# Patient Record
Sex: MALE | Race: WHITE | NOT HISPANIC OR LATINO | Employment: OTHER | URBAN - NONMETROPOLITAN AREA
[De-identification: names, ages, dates, MRNs, and addresses within clinical notes are randomized per-mention and may not be internally consistent; named-entity substitution may affect disease eponyms.]

---

## 2017-07-13 ENCOUNTER — TRANSCRIBE ORDERS (OUTPATIENT)
Dept: PHYSICAL THERAPY | Facility: CLINIC | Age: 67
End: 2017-07-13

## 2017-07-13 DIAGNOSIS — Z98.890 S/P ROTATOR CUFF REPAIR: Primary | ICD-10-CM

## 2017-07-14 ENCOUNTER — CONSULT (OUTPATIENT)
Dept: PHYSICAL THERAPY | Facility: CLINIC | Age: 67
End: 2017-07-14

## 2017-07-14 DIAGNOSIS — Z98.890 S/P ROTATOR CUFF REPAIR: Primary | ICD-10-CM

## 2017-07-14 PROCEDURE — 97110 THERAPEUTIC EXERCISES: CPT | Performed by: PHYSICAL THERAPIST

## 2017-07-14 PROCEDURE — G0283 ELEC STIM OTHER THAN WOUND: HCPCS | Performed by: PHYSICAL THERAPIST

## 2017-07-14 PROCEDURE — G8985 CARRY GOAL STATUS: HCPCS | Performed by: PHYSICAL THERAPIST

## 2017-07-14 PROCEDURE — G8984 CARRY CURRENT STATUS: HCPCS | Performed by: PHYSICAL THERAPIST

## 2017-07-14 PROCEDURE — 97161 PT EVAL LOW COMPLEX 20 MIN: CPT | Performed by: PHYSICAL THERAPIST

## 2017-07-14 NOTE — PROGRESS NOTES
Outpatient Physical Therapy Ortho Initial Evaluation       Patient Name: Earl Romano  : 1950  MRN: [id number]  Today's Date: 2017      Visit Date: 2017    TIME IN 1145    TIME OUT 1233    There is no problem list on file for this patient.       History reviewed. No pertinent past medical history.     No past surgical history on file.    Visit Dx:     ICD-10-CM ICD-9-CM   1. S/P rotator cuff repair Z98.890 V45.89     Outpatient Medications     None      Allergies: NKA    Subjective Evaluation    History of Present Illness  Mechanism of injury: 68 yo male with a traumatic fall while fishing and had a left rotator cuff tear, s/p left rotator cuff repair on 17. Now 2 1/2 weeks post op and in a sling. Pt c/o severe left shoulder pain despite using pain meds and having trouble sleeping at night, having to sleep in a chair.     Quality of life: good    Pain  Current pain ratin  At best pain ratin  At worst pain rating: 10  Location: left shoulder  Quality: burning  Relieving factors: ice and support  Aggravating factors: sleeping and movement  Progression: improved    Social Support  Lives with: spouse    Hand dominance: right    Treatments  Current treatment: medication and physical therapy  Patient Goals  Patient goals for therapy: decreased pain, increased motion, increased strength and return to sport/leisure activities  Patient goal: STG's (in 2 weeks)  1. Pt I with HEP  2. Improve left shoulder PROM flex/abd to 100 deg and 90 deg  3. Improve left elbow extension AROM to 0 deg  4. Reduce left shoulder pain to 4/10 performing ADL's.    LTG's (in 4 weeks)  1. Improve left shoulder PROM flex/abd to 130 deg and 110 degrees.  2. Improve left shoulder MMT to 3/5 (all planes) and 4/5 left elbow flex/ext.  3. Reduce left shoulder pain to 2/10 performing ADL's.  4. Reduce Quick Dash score to 40 or less.       RUE PROM (degrees)  R Shoulder Flexion  0-170: 125 Degrees (AROM)  R Shoulder  ABduction 0-140: 165 Degrees (AROM)  RUE Strength  RUE Overall Strength: Within Functional Limits - strength 5/5           LUE PROM (degrees)  L Shoulder Flexion  0-170: 64 Degrees  L Shoulder ABduction 0-40: 53 Degrees  L Shoulder Internal Rotation  0-70: 50 Degrees  L Shoulder External Rotation  0-90: 30 Degrees  L Elbow Flexion/Extension 0-135-150:  (-10 deg ext)  LUE Strength  LUE Overall Strength:  (left shoulder NT due to recent RTC repair)  L Elbow Flexion: 3/5  L Elbow Extension: 3/5            Body Part  Body Part: Shoulder                   Ice  Ice Applied: Yes  Location: L shoulder  Rx Minutes: 15 mins  Ice S/P Rx: Yes    Electrical Stimulation  Stimulation Type: IFC  Location/Electrode Placement/Other: L shoulder  Rx Minutes: 15 mins              Exercises       07/14/17 1230          Exercise 1    Exercise Name 1 pendulums: side to side/cw-ccw/f-b  -BS      Exercise 2    Exercise Name 2 scapular retractions  -BS      Exercise 3    Exercise Name 3 shoulder shrugs  -BS        User Key  (r) = Recorded By, (t) = Taken By, (c) = Cosigned By    Initials Name Provider Type    JOSÉ MIGUEL Cardona, PT Physical Therapist           Therapeutic exercise (total time): 10 minutes          Assessment & Plan     Assessment  Impairments: abnormal or restricted ROM, activity intolerance, impaired physical strength, lacks appropriate home exercise program and pain with function  Assessment details: 68 yo male with acute left shoulder pain s/p left rotator cuff repair. Presents with limited left shoulder PROM, impaired left shoulder weakness, left shoulder pain and loss in function.  Prognosis: good    Goals  STG's (in 2 weeks)  1. Pt I with HEP  2. Improve left shoulder PROM flex/abd to 100 deg and 90 deg  3. Improve left elbow extension AROM to 0 deg  4. Reduce left shoulder pain to 4/10 performing ADL's.    LTG's (in 4 weeks)  1. Improve left shoulder PROM flex/abd to 130 deg and 110 degrees.  2. Improve left shoulder MMT  to 3/5 (all planes) and 4/5 left elbow flex/ext.  3. Reduce left shoulder pain to 2/10 performing ADL's.  4. Reduce Quick Dash score to 40 or less.    Plan  Therapy options: will be seen for skilled physical therapy services  Planned modality interventions: cryotherapy, electrical stimulation/Russian stimulation, thermotherapy (hydrocollator packs) and ultrasound  Planned therapy interventions: flexibility, functional ROM exercises, home exercise program, joint mobilization, manual therapy, postural training, soft tissue mobilization, spinal/joint mobilization, strengthening and stretching  Frequency: 3x week  Duration in weeks: 12  Treatment plan discussed with: patient  Plan details: F/u with rotator cuff repair protocol.  Functional Limitations: carrying objects, lifting, pushing, uncomfortable because of pain, moving in bed, reaching behind back, reaching overhead and unable to perform repetitive tasks      Time Calculation: 41            PT G-Codes  Outcome Measure Options: Quick DASH  Score: 84.09  Functional Limitation: Carrying, moving and handling objects  Carrying, Moving and Handling Objects Current Status (): At least 80 percent but less than 100 percent impaired, limited or restricted  Carrying, Moving and Handling Objects Goal Status (): At least 1 percent but less than 20 percent impaired, limited or restricted         Etienne Cardona, PT  7/14/2017        Earl Romano    1950

## 2017-07-18 ENCOUNTER — TREATMENT (OUTPATIENT)
Dept: PHYSICAL THERAPY | Facility: CLINIC | Age: 67
End: 2017-07-18

## 2017-07-18 DIAGNOSIS — Z98.890 S/P ROTATOR CUFF REPAIR: Primary | ICD-10-CM

## 2017-07-18 PROCEDURE — 97110 THERAPEUTIC EXERCISES: CPT | Performed by: PHYSICAL THERAPIST

## 2017-07-18 PROCEDURE — G0283 ELEC STIM OTHER THAN WOUND: HCPCS | Performed by: PHYSICAL THERAPIST

## 2017-07-18 NOTE — PROGRESS NOTES
Daily Treatment Note    Time In: 10:10     Time Out: 11:02    ICD-10-CM ICD-9-CM   1. S/P rotator cuff repair Z98.890 V45.89       Subjective   S/P 3 weeks 1 day. Pt reports not having a good day.   Patient reports to therapy 8/10 pain, and % improvement.  Attendance  2/2 visits. Recert 8-4-17      Objective     V cues necessary for correct TE performance. Mild guarding intermittent with PROM flex/abd. PROM flex and abd about 100°.       PROCEDURES AND MODALITIES:     Ice  Ice Applied: Yes  Location: L shoulder  Rx Minutes: 15 mins  Ice S/P Rx: Yes    Electrical Stimulation  Stimulation Type: IFC  Location/Electrode Placement/Other: L shoulder  Rx Minutes: 15 mins       EXERCISE  Exercise 1  Exercise Name 1: Post shld rolls  Sets/Reps 1: 20x  Exercise 2  Exercise Name 2: Scap retraction  Sets/Reps 2: 20x Exercise 3  Exercise Name 3: Shoulder shrugs  Sets/Reps 3: 20x Exercise 4  Exercise Name 4: PROM (PROM for 6 weeks post op)  Time 4: 18'                                                   Therapy Exercise [id number] 37 minutes and Other Procedure CPT 15 minutes Electrical Stimulation    Total Treatment Time: 52 Minutes    Assessment/Plan   ROM improved. Good tolerance of today's treatment.   Progress per Plan of Care             Ramiro Cheng, PTA  Physical Therapist

## 2017-07-19 ENCOUNTER — TREATMENT (OUTPATIENT)
Dept: PHYSICAL THERAPY | Facility: CLINIC | Age: 67
End: 2017-07-19

## 2017-07-19 DIAGNOSIS — Z98.890 S/P ROTATOR CUFF REPAIR: Primary | ICD-10-CM

## 2017-07-19 PROCEDURE — G0283 ELEC STIM OTHER THAN WOUND: HCPCS | Performed by: PHYSICAL THERAPIST

## 2017-07-19 PROCEDURE — 97110 THERAPEUTIC EXERCISES: CPT | Performed by: PHYSICAL THERAPIST

## 2017-07-19 NOTE — PROGRESS NOTES
Daily Treatment Note    Time In: 1445      Time Out: 1540    ICD-10-CM ICD-9-CM   1. S/P rotator cuff repair Z98.890 V45.89       Subjective   Pt reports doing HEP 3x/day  Patient reports to therapy 6/10 pain in the left shoulder.       Visit: 3/3    Return to MD: 3- 4 weeks    Re-certification date: 8/4/17    % improvement: 5%        Objective        PROM: left shoulder abduction ~ 80 degrees            PROCEDURES AND MODALITIES:            Ice  Ice Applied: Yes  Location: L shoulder  Rx Minutes: 15 mins  Ice S/P Rx: Yes    Electrical Stimulation  Stimulation Type: IFC  Location/Electrode Placement/Other: L shoulder  Rx Minutes: 15 mins                   EXERCISE  Exercise 1  Exercise Name 1: pendulums: side to side/cw-ccw/f-b  Sets/Reps 1: 20x  Exercise 2  Exercise Name 2: Scap retraction  Sets/Reps 2: 20x Exercise 3  Exercise Name 3: Shoulder shrugs  Sets/Reps 3: 20x Exercise 4  Exercise Name 4: PROM Exercise 5  Exercise Name 5: seated pulleys  Equipment/Resistance 5: flex/scap  Time 5: 2'/2' Exercise 6  Exercise Name 6: supine AAROM L shoulder flex  Sets/Reps 6: 1/20   Exercise 7  Exercise Name 7: supine L shoulder AAROM ER/IR w/ dowel  Sets/Reps 7: 1/20                                           Therapy Exercise [id number] 40 minutes and Other Procedure CPT 15 minutes unattended electrical stimulation    Total Treatment Time: 55 Minutes    Assessment/Plan   Patient with good activity tolerance, reduced left shoulder pain to 5/10 level after session, pt feels the left shoulder felt looser after the session  Recheck tolerance to use of seated pulleys         Etienne Cardona, PT  Physical Therapist

## 2017-07-21 ENCOUNTER — TREATMENT (OUTPATIENT)
Dept: PHYSICAL THERAPY | Facility: CLINIC | Age: 67
End: 2017-07-21

## 2017-07-21 DIAGNOSIS — Z98.890 S/P ROTATOR CUFF REPAIR: Primary | ICD-10-CM

## 2017-07-21 PROCEDURE — 97140 MANUAL THERAPY 1/> REGIONS: CPT | Performed by: PHYSICAL THERAPIST

## 2017-07-21 PROCEDURE — G0283 ELEC STIM OTHER THAN WOUND: HCPCS | Performed by: PHYSICAL THERAPIST

## 2017-07-21 PROCEDURE — 97110 THERAPEUTIC EXERCISES: CPT | Performed by: PHYSICAL THERAPIST

## 2017-07-21 NOTE — PROGRESS NOTES
Daily Treatment Note    Time In: 0815      Time Out: 0905    ICD-10-CM ICD-9-CM   1. S/P rotator cuff repair Z98.890 V45.89       Subjective   Pt reports did HEP 3 times yesterday, feels a bit more sore than usual this morning, difficulty sleeping at night, sleeps in a chair still, feels like toothache type pain in the deltoid region, iced his shoulder twice a day yesterday.  Patient reports to therapy 5-6/10 pain, and  5% improvement.  Attendance  4/4 visits.       Objective        PROM: left shoulder abduction ~100 deg, left shoulder flex ~110 deg            PROCEDURES AND MODALITIES:            Ice  Ice Applied: Yes  Location: L shoulder  Rx Minutes: 15 mins  Ice S/P Rx: Yes    Electrical Stimulation  Stimulation Type: IFC  Location/Electrode Placement/Other: L shoulder  Rx Minutes: 15 mins            EXERCISE  Exercise 1  Exercise Name 1: pendulums: side to side/cw-ccw/f-b  Exercise 2  Exercise Name 2: Scap retraction Exercise 3  Exercise Name 3: Shoulder shrugs Exercise 4  Exercise Name 4: PROM  Time 4: 6' Exercise 5  Exercise Name 5: seated pulleys  Time 5: 2'/2' Exercise 6  Exercise Name 6: supine AAROM L shoulder flex  Sets/Reps 6: 1/15   Exercise 7  Exercise Name 7: supine L shoulder AAROM ER/IR w/ dowel Exercise 8  Exercise Name 8: left elbow flex/ext AROM  Sets/Reps 8: 1/20                                     Therapeutic exercise (total time): 25 minutes    MANUAL PT:  Manual Rx 1 Location: S/L scapular mobs  Manual Rx 1 Duration: 6'  Manual Rx 2 Location: Supine left shoulder ant/post jt mobs  Manual Rx 2 Grade: II  Manual Rx 2 Duration: 4'        Manual PT [id number] 10 minutes, Therapy Exercise [id number] 25 minutes and Other Procedure CPT 15 minutes unattended electrical stimulation to the left shoulder    Total Treatment Time: 50 Minutes    Assessment/Plan   Patient with slight reduction in left shoulder pain to 4/10 level after the session, improved PROM of left shoulder flex/abd after gentle (grade II) mobs  to the left shoulder  Continue with AAROM ex's to left shoulder as able.           Etienne Cardona, PT  Physical Therapist

## 2017-07-25 ENCOUNTER — TREATMENT (OUTPATIENT)
Dept: PHYSICAL THERAPY | Facility: CLINIC | Age: 67
End: 2017-07-25

## 2017-07-25 DIAGNOSIS — Z98.890 S/P ROTATOR CUFF REPAIR: Primary | ICD-10-CM

## 2017-07-25 PROCEDURE — 97110 THERAPEUTIC EXERCISES: CPT | Performed by: PHYSICAL THERAPIST

## 2017-07-25 PROCEDURE — G0283 ELEC STIM OTHER THAN WOUND: HCPCS | Performed by: PHYSICAL THERAPIST

## 2017-07-25 NOTE — PROGRESS NOTES
Daily Treatment Note    Time In: 1018      Time Out: 1105    ICD-10-CM ICD-9-CM   1. S/P rotator cuff repair Z98.890 V45.89       Subjective   Pt reports still having considerable trouble sleeping at night due to left shoulder pain. At 4 weeks post op now.  Patient reports to therapy 3-4/10 pain, and  5% improvement.  Attendance  5/5 visits.       Objective        PROM:    left shoulder-flex 110 deg abd 82 deg ER 66 deg IR 57 deg           PROCEDURES AND MODALITIES:             Ice  Ice Applied: Yes  Location: L shoulder  Rx Minutes: 15 mins  Ice S/P Rx: Yes    Electrical Stimulation  Stimulation Type: IFC  Location/Electrode Placement/Other: L shoulder  Rx Minutes: 15 mins                EXERCISE  Exercise 1  Exercise Name 1: Pro II, level 1  Time: 5'  Exercise 2  Exercise Name 2: seated pulleys   Equipment/Resistance 2: flex/scaption  Time 2: 2'/2' Exercise 3  Exercise Name 3: shoulder shrugs  Sets/Reps 3: 20 Exercise 4  Exercise Name 4: scapular retractions  Sets/Reps 4: 1/20 Exercise 5  Exercise Name 5: standing shoulder ext AAROM w/ dowel  Time 5: 1/20 Exercise 6  Exercise Name 6: supine AAROM L shoulder flex  Sets/Reps 6: 1/20   Exercise 7  Exercise Name 7: supine L shoulder AAROM ER/IR w/ dowel  Sets/Reps 7: 1/20 Exercise 8  Exercise Name 8: left elbow flex/ext AROM  Sets/Reps 8: 1/20 Exercise 9  Exercise Name 9: PROM left shoulder in supine  Equipment/Resistance 9: flex/abd/IR  Time 9: 5'                                       Therapy Exercise 45403 32 minutes and Other Procedure CPT 15 minutes unattended electrical stimulation    Total Treatment Time: 47 Minutes    Assessment/Plan   Pt progressing well toward PROM goals for the left shoulder, tolerated use of upper arm ergometer (AAROM with left UE) without complaints.  Continue per RTC protocol.      Etienne Cardona, PT  Physical Therapist

## 2017-07-27 ENCOUNTER — TREATMENT (OUTPATIENT)
Dept: PHYSICAL THERAPY | Facility: CLINIC | Age: 67
End: 2017-07-27

## 2017-07-27 DIAGNOSIS — Z98.890 S/P ROTATOR CUFF REPAIR: Primary | ICD-10-CM

## 2017-07-27 PROCEDURE — 97110 THERAPEUTIC EXERCISES: CPT | Performed by: PHYSICAL THERAPIST

## 2017-07-27 PROCEDURE — G0283 ELEC STIM OTHER THAN WOUND: HCPCS | Performed by: PHYSICAL THERAPIST

## 2017-07-27 NOTE — PROGRESS NOTES
"Daily Treatment Note    Time In: 1530      Time Out: 1620    ICD-10-CM ICD-9-CM   1. S/P rotator cuff repair Z98.890 V45.89       Subjective   Pt reports doing HEP 3x/day, surgery on 6/26/17  Patient reports to therapy 4/10 pain, and  30% improvement.  Attendance  6/6 visits.       Objective        AAROM: left shoulder abduction ~80 degrees               PROCEDURES AND MODALITIES:            Ice  Ice Applied: Yes  Location: L shoulder  Rx Minutes: 15 mins  Ice S/P Rx: Yes    Electrical Stimulation  Stimulation Type: IFC  Location/Electrode Placement/Other: L shoulder  Rx Minutes: 15 mins                   EXERCISE  Exercise 1  Exercise Name 1: Pro II, level 1  Time: 8'  Exercise 2  Exercise Name 2: seated pulleys   Equipment/Resistance 2: flex/scaption  Time 2: 2'/2' Exercise 3  Exercise Name 3: shoulder shrugs  Sets/Reps 3: 20 Exercise 4  Exercise Name 4: scapular retractions  Sets/Reps 4: 1/20 Exercise 5  Exercise Name 5: 5 way sub max iso: flex,ext,ir/er, abd  Sets/Reps 5: 1/10  Time 5: 5\" Exercise 6  Exercise Name 6: PROM left shoulder  Time 6: 4'     Exercise 8  Exercise Name 8: left elbow flex/ext AROM  Sets/Reps 8: 1/20                                         Therapy Exercise [id number] 35 minutes and Other Procedure CPT 15 minutes unattended electrical stimulation    Total Treatment Time: 50 Minutes    Assessment/Plan   Pt with good tolerance to performing submaximal isometrics, reporting slight increase in left shoulder pain with performing isometrics for the first time.    Progress per Plan of Care             Etienne Cardona, PT  Physical Therapist    "

## 2017-07-28 ENCOUNTER — TREATMENT (OUTPATIENT)
Dept: PHYSICAL THERAPY | Facility: CLINIC | Age: 67
End: 2017-07-28

## 2017-07-28 DIAGNOSIS — Z98.890 S/P ROTATOR CUFF REPAIR: Primary | ICD-10-CM

## 2017-07-28 PROCEDURE — G0283 ELEC STIM OTHER THAN WOUND: HCPCS | Performed by: PHYSICAL THERAPIST

## 2017-07-28 PROCEDURE — 97110 THERAPEUTIC EXERCISES: CPT | Performed by: PHYSICAL THERAPIST

## 2017-07-28 NOTE — PROGRESS NOTES
"Daily Treatment Note    Time In: 0800     Time Out: 851    ICD-10-CM ICD-9-CM   1. S/P rotator cuff repair Z98.890 V45.89       Subjective   Pt reports more sore this morning, possibly due to adding isometrics to plan of care yesterday  Patient reports to therapy 3/10 pain, and  30% improvement.  Attendance  7/7 visits.       Objective        AAROM: L shoulder- flexion 113 deg abd 74 deg ER 59 deg IR 50 deg               PROCEDURES AND MODALITIES:            Ice  Ice Applied: Yes  Location: L shoulder  Rx Minutes: 15 mins  Ice S/P Rx: Yes    Electrical Stimulation  Stimulation Type: IFC  Location/Electrode Placement/Other: L shoulder  Rx Minutes: 15 mins                   EXERCISE  Exercise 1  Exercise Name 1: Pro II, level 1  Time: 6'  Exercise 2  Exercise Name 2: seated pulleys   Equipment/Resistance 2: flex/scaption  Time 2: 2' ea Exercise 3  Exercise Name 3: supine AAROM L shoulder flex w/ dowel  Sets/Reps 3: 1/20   Exercise 5  Exercise Name 5: 5 way sub max iso: flex,ext,ir/er, abd  Sets/Reps 5: 1/10  Time 5: 5\" Exercise 6  Exercise Name 6: PROM left shoulder  Time 6: 3'   Exercise 7  Exercise Name 7: supine L shoulder AAROM ER/IR w/ dowel  Sets/Reps 7: 1/20 Exercise 8  Exercise Name 8: left elbow flex/ext AROM  Sets/Reps 8: 1/20                                       MANUAL PT:     Manual Rx 2 Location: supine post glides to left GH joint  Manual Rx 2 Grade: II  Manual Rx 2 Duration: 1'          Therapy Exercise [id number] 36 minutes and Other Procedure CPT 15 minutes unattended electrical stimulation    Total Treatment Time: 51 Minutes    Assessment/Plan   Pt overall progressing toward goals, increased AAROM of left shoulder today, continue with RTC protocol    Progress per Plan of Care             Etienne Cardona, PT  Physical Therapist    "

## 2017-08-02 ENCOUNTER — TREATMENT (OUTPATIENT)
Dept: PHYSICAL THERAPY | Facility: CLINIC | Age: 67
End: 2017-08-02

## 2017-08-02 DIAGNOSIS — Z98.890 S/P ROTATOR CUFF REPAIR: Primary | ICD-10-CM

## 2017-08-02 PROCEDURE — G0283 ELEC STIM OTHER THAN WOUND: HCPCS | Performed by: PHYSICAL THERAPIST

## 2017-08-02 PROCEDURE — 97110 THERAPEUTIC EXERCISES: CPT | Performed by: PHYSICAL THERAPIST

## 2017-08-02 NOTE — PROGRESS NOTES
"Daily Treatment Note    Time In: 0802      Time Out: 0855    ICD-10-CM ICD-9-CM   1. S/P rotator cuff repair Z98.890 V45.89       Subjective     Visit: 8/8    Return to MD: next week    Re-certification date: 8/04/2017    % improvement: 30        Patient reports to therapy with 2/10 left shoulder pain.      Objective        AROM: deferred testing               PROCEDURES AND MODALITIES:            Ice  Ice Applied: Yes  Location: L shoulder  Rx Minutes: 15 mins  Ice S/P Rx: Yes    Electrical Stimulation  Stimulation Type: IFC  Location/Electrode Placement/Other: L shoulder  Rx Minutes: 15 mins                   EXERCISE  Exercise 1  Exercise Name 1: Pro II, level 2  Time: 6'  Exercise 2  Exercise Name 2: seated pulleys   Equipment/Resistance 2: flex/scaption  Time 2: 2' ea Exercise 3  Exercise Name 3: standing left shoulder ext AAROM w/ dowel   Sets/Reps 3: 1/20 Exercise 4  Exercise Name 4: wall pushups  Sets/Reps 4: 1/10 Exercise 5  Exercise Name 5: 5 way sub max iso: flex,ext,ir/er, abd  Sets/Reps 5: 1/10  Time 5: 5\" Exercise 6  Exercise Name 6: supine left shoulder flex AAROM w/ dowel  Sets/Reps 6: 1/20     Exercise 8  Exercise Name 8: left elbow flex/ext AROM  Sets/Reps 8: 1/20                                          Therapy Exercise [id number] 39 minutes and Other Procedure CPT 15 minutes unattended electrical stimulation    Total Treatment Time: 54 Minutes    Assessment/Plan   Pt with good activity tolerance, progressing steadily toward all goals, good tolerance to wall push ups and sidelying left shoulder ER (no increase in baseline left shoulder pain)  Continue per RTC protocol.         Etienne Cardona, PT  Physical Therapist    "

## 2017-08-04 ENCOUNTER — TREATMENT (OUTPATIENT)
Dept: PHYSICAL THERAPY | Facility: CLINIC | Age: 67
End: 2017-08-04

## 2017-08-04 DIAGNOSIS — Z98.890 S/P ROTATOR CUFF REPAIR: Primary | ICD-10-CM

## 2017-08-04 PROCEDURE — G0283 ELEC STIM OTHER THAN WOUND: HCPCS | Performed by: PHYSICAL THERAPIST

## 2017-08-04 PROCEDURE — 97110 THERAPEUTIC EXERCISES: CPT | Performed by: PHYSICAL THERAPIST

## 2017-08-04 NOTE — PROGRESS NOTES
Daily Treatment Note    Time In: 8:00      Time Out: 9:03    ICD-10-CM ICD-9-CM   1. S/P rotator cuff repair Z98.890 V45.89       Subjective   S/P 5 weeks 4 days. Pt reports doing well today.   Patient reports to therapy 2/10 pain, and  30% improvement.  Attendance  9/9 visits. Recert 8-4-17. MD f/u 8-7-17.      Objective    Restriction with PROM flex/abd. V cues necessary for correct TE performance. MD note written.     PROM: Abd: 100°, Flex 115°.      PROCEDURES AND MODALITIES:       Ice  Ice Applied: Yes  Location: L shoulder  Rx Minutes: 15 mins  Ice S/P Rx: Yes    Electrical Stimulation  Stimulation Type: IFC  Location/Electrode Placement/Other: L shoulder  Rx Minutes: 15 mins       EXERCISE  Exercise 1  Exercise Name 1: Pro II, level 2  Time: 8'  Exercise 2  Exercise Name 2: Pulleys  Time 2: 2' Exercise 3  Exercise Name 3: PROM  Time 3: 8' Exercise 4  Exercise Name 4: Supine wand flex  Sets/Reps 4: 2/10x Exercise 5  Exercise Name 5: Supine CP  Equipment/Resistance 5: 3# bar  Sets/Reps 5: 30x Exercise 6  Exercise Name 6: ST wand abd  Sets/Reps 6: 2/10x   Exercise 7  Exercise Name 7: Rows  Equipment/Resistance 7: red tb  Sets/Reps 7: 20x Exercise 8  Exercise Name 8: PB on table: A-Z  Sets/Reps 8: 1x                                       Therapy Exercise [id number] 48 minutes and Other Procedure CPT 15 minutes Electrical Stimulation    Total Treatment Time: 63 Minutes    Assessment/Plan   ROM improved today. Good tolerance of today's treatment.   Progress per Plan of Care, recheck.              Ramiro Cheng, PTA  Physical Therapist

## 2017-08-09 ENCOUNTER — TREATMENT (OUTPATIENT)
Dept: PHYSICAL THERAPY | Facility: CLINIC | Age: 67
End: 2017-08-09

## 2017-08-09 DIAGNOSIS — Z98.890 S/P ROTATOR CUFF REPAIR: Primary | ICD-10-CM

## 2017-08-09 PROCEDURE — G0283 ELEC STIM OTHER THAN WOUND: HCPCS | Performed by: PHYSICAL THERAPIST

## 2017-08-09 PROCEDURE — 97110 THERAPEUTIC EXERCISES: CPT | Performed by: PHYSICAL THERAPIST

## 2017-08-09 NOTE — PROGRESS NOTES
Daily Treatment Note    Time In: 8:00      Time Out: 9:00    ICD-10-CM ICD-9-CM   1. S/P rotator cuff repair Z98.890 V45.89       Subjective   Pt reports having good MD f/u yesterday. He has D/C'd sling. Will cont PT.   Patient reports to therapy 2/10 pain, and 50% improvement.  Attendance  10/10 visits. Recert next treatment. MD f/u 4 weeks.      Objective     V cues necessary for correct TE performance. L UT comp with overhead reach attempts.         PROCEDURES AND MODALITIES:     Ice  Ice Applied: Yes  Location: L shoulder  Rx Minutes: 15 mins  Ice S/P Rx: Yes    Electrical Stimulation  Stimulation Type: IFC  Location/Electrode Placement/Other: L shoulder  Rx Minutes: 15 mins    EXERCISE  Exercise 1  Exercise Name 1: Pro II, level 2  Time: 8'  Exercise 2  Exercise Name 2: Pulleys  Time 2: 2' Exercise 3  Exercise Name 3: Wand flex, abd, ext  Sets/Reps 3: 2/10x Exercise 4  Exercise Name 4: Supine wand CP  Equipment/Resistance 4: 3#  Sets/Reps 4: 20x Exercise 5  Exercise Name 5: S/L ER  Sets/Reps 5: 2/12x Exercise 6  Exercise Name 6: PB on table ABC's  Sets/Reps 6: 1x   Exercise 7  Exercise Name 7: Rows  Equipment/Resistance 7: red tb  Sets/Reps 7: 30x Exercise 8  Exercise Name 8: Functional reach up  Sets/Reps 8: 2/10x                                         Therapy Exercise [id number] 45 minutes and Other Procedure CPT 15 minutes Electrical Stimulation    Total Treatment Time: 60 Minutes    Assessment/Plan   Good tolerance of today's treatment. ROM and strength continue to need increasing.   Progress per Plan of Care             Ramiro Cheng, PTA  Physical Therapist

## 2017-08-11 ENCOUNTER — TREATMENT (OUTPATIENT)
Dept: PHYSICAL THERAPY | Facility: CLINIC | Age: 67
End: 2017-08-11

## 2017-08-11 DIAGNOSIS — Z98.890 S/P ROTATOR CUFF REPAIR: Primary | ICD-10-CM

## 2017-08-11 PROCEDURE — G8985 CARRY GOAL STATUS: HCPCS | Performed by: PHYSICAL THERAPIST

## 2017-08-11 PROCEDURE — 97140 MANUAL THERAPY 1/> REGIONS: CPT | Performed by: PHYSICAL THERAPIST

## 2017-08-11 PROCEDURE — G8984 CARRY CURRENT STATUS: HCPCS | Performed by: PHYSICAL THERAPIST

## 2017-08-11 PROCEDURE — 97110 THERAPEUTIC EXERCISES: CPT | Performed by: PHYSICAL THERAPIST

## 2017-08-11 PROCEDURE — G0283 ELEC STIM OTHER THAN WOUND: HCPCS | Performed by: PHYSICAL THERAPIST

## 2017-08-11 NOTE — PROGRESS NOTES
Recertification    Diagnosis/ICD-10 Code:  S/P rotator cuff repair [Z98.890]  Referring practitioner: Toni Sandoval MD follow up 9/5    Date of Initial Visit: 8/11/2017  Patient seen for 11/14 sessions    Subjective:   Earl Rosalina states: Pain is 3/10 most days. Pain 3/10 post treatment.      Objective:   Current condition: Good  Test measurement: Flexion 135, Abd 100     , ER  58  IR 50         PROCEDURES AND MODALITIES:     Ice  Location: L shoulder  Rx Minutes: 15 mins  Ice S/P Rx: Yes    Electrical Stimulation  Stimulation Type: IFC  Location/Electrode Placement/Other: L shoulder  Rx Minutes: 15 mins          EXERCISE 30  Exercise 1  Exercise Name 1: Pro II, level 2  Time: 10    Exercise 2  Exercise Name 2: Pulleys  Time 2: 3 Exercise 3  Exercise Name 3: Wand flex, abd, ext  Sets/Reps 3: 2/10x Exercise 4  Exercise Name 4: Supine wand CP  Equipment/Resistance 4: 3#  Sets/Reps 4: 20x Exercise 5  Exercise Name 5: S/L ER  Sets/Reps 5: 2/12x Exercise 6  Exercise Name 6: Serratus punch  Sets/Reps 6: 20                                         MANUAL PT: 8  Manual Rx 1 Location: PROM   Manual Rx 1 Duration: 5  Manual Rx 2 Location: supine post glides to left GH joint  Manual Rx 2 Grade: II  Manual Rx 2 Duration: 3        Assessment:   Summary of Treatment:   Progress toward previous goals: Continue STG/LTG STG 2,4 met. LTG1  partially met, 4 met        Plan:   Goals  Short-term goals (STG):  1. Independent in HEP     2.  Shoulder AROM flexion 140     3. Pain 2/10 at rest.     4. ER PROM 70    Long-term goals (LTG):   1. Quick dash score 30% or less     2. MMT 4/5 flexion and abd     3. Subjective improvement 75%    Timeframe: 1 month  Prognosis to achieve goals: good    Plan  Treatment plan with rationale: The patient is to  be seen 2 time(s) per week, for 4 week(s) to progress toward short and long term goals.  Therapeutic exercises to increase functional mobility  Perform modalities as therapeutically necessary  to decrease pain and increase mobility  Recommendations: Initiate/continue PT services follow MD instructions for progression.  AAROM and light resistance. No RTC strengthening at this time    PT Signature: Koki Powers, PT, ATC, DPT      Based upon review of the patient's progress and continued therapy plan, it is my medical opinion that Earl Romano should continue physical therapy treatment at [facility]  [address] [place] [address] Dr Severino QUISPE [id number]  [phone].    Signature:  Toni Sandoval MD

## 2017-08-15 ENCOUNTER — TREATMENT (OUTPATIENT)
Dept: PHYSICAL THERAPY | Facility: CLINIC | Age: 67
End: 2017-08-15

## 2017-08-15 DIAGNOSIS — Z98.890 S/P ROTATOR CUFF REPAIR: Primary | ICD-10-CM

## 2017-08-15 PROCEDURE — 97110 THERAPEUTIC EXERCISES: CPT | Performed by: PHYSICAL THERAPIST

## 2017-08-15 PROCEDURE — G0283 ELEC STIM OTHER THAN WOUND: HCPCS | Performed by: PHYSICAL THERAPIST

## 2017-08-15 NOTE — PROGRESS NOTES
"Daily Treatment Note    Time In: 7:27      Time Out: 8:19    ICD-10-CM ICD-9-CM   1. S/P rotator cuff repair Z98.890 V45.89       Subjective   Pt reports increased pain since prev treatment. He has had to increase pain med.   Patient reports to therapy 8/10 pain, and  50% improvement.  Attendance  12/15 visits. Eddi 9-1-17. MD grider/gabrielle 9-5-17.      Objective    Mild guarding with PROM. V cues necessary for correct TE performance. Post treatment pain 7/10.     PROCEDURES AND MODALITIES:       Ice  Ice Applied: Yes  Location: L shoulder  Rx Minutes: 15 mins  Ice S/P Rx: Yes    Electrical Stimulation  Stimulation Type: IFC  Location/Electrode Placement/Other: L shoulder  Rx Minutes: 15 mins    EXERCISE  Exercise 1  Exercise Name 1: PRO2  Equipment/Resistance 1: 2.5  Time: 6'  Exercise 2  Exercise Name 2: Pulleys  Sets/Reps 2: 2/2' Exercise 3  Exercise Name 3: PROM  Time 3: 13\" Exercise 4  Exercise Name 4: Supine wand flex  Sets/Reps 4: 2/10x                                                   Therapy Exercise [id number] 37 minutes and Other Procedure CPT 15 minutes Electrical Stimulation    Total Treatment Time: 52 Minutes    Assessment/Plan   Decreased TE intensity today 2° pain and reactivity. Fair rom of TE.   Progress per Plan of Care             Ramiro Cheng, PTA  Physical Therapist    "

## 2017-08-24 ENCOUNTER — TREATMENT (OUTPATIENT)
Dept: PHYSICAL THERAPY | Facility: CLINIC | Age: 67
End: 2017-08-24

## 2017-08-24 DIAGNOSIS — Z98.890 S/P ROTATOR CUFF REPAIR: Primary | ICD-10-CM

## 2017-08-24 PROCEDURE — G0283 ELEC STIM OTHER THAN WOUND: HCPCS | Performed by: PHYSICAL THERAPIST

## 2017-08-24 PROCEDURE — 97110 THERAPEUTIC EXERCISES: CPT | Performed by: PHYSICAL THERAPIST

## 2017-08-24 NOTE — PROGRESS NOTES
Daily Treatment Note    Time In: 1315     Time Out: 1410    ICD-10-CM ICD-9-CM   1. S/P rotator cuff repair Z98.890 V45.89       Subjective   No new complaints. Pt reports doing a lot of cooking the last few days with no increased shld pain.   Patient reports to therapy 0/10 pain, and  50% improvement.  Attendance  13/16 visits. Recert 9/1. MD f/gabrielle 9/5.      Objective     V cues necessary for correct TE performance. UT comp with shld measurements. Post treatment pain 0/10.     AROM: Standing   Flex 86°, abd 60° both with compensation.      PROCEDURES AND MODALITIES:     Ice  Ice Applied: Yes  Location: L shoulder  Rx Minutes: 15 mins  Ice S/P Rx: Yes    Electrical Stimulation  Stimulation Type: IFC  Location/Electrode Placement/Other: L shoulder  Rx Minutes: 15 mins       EXERCISE  Exercise 1  Exercise Name 1: PRO2  Equipment/Resistance 1: 3.5  Time: 8'  Exercise 2  Exercise Name 2: Pulleys  Sets/Reps 2: 2' Exercise 3  Exercise Name 3: PROM  Time 3: 6.5' Exercise 4  Exercise Name 4: Supine wand flex  Sets/Reps 4: 2/10x Exercise 5  Exercise Name 5: ST wand abd  Sets/Reps 5: 2/10x Exercise 6  Exercise Name 6: NO$  Equipment/Resistance 6: yellow tb  Sets/Reps 6: 2/10x   Exercise 7  Exercise Name 7: Rows  Equipment/Resistance 7: green tb  Sets/Reps 7: 20x Exercise 8  Exercise Name 8: Wall push-ups  Sets/Reps 8: 20x                                         Therapy Exercise [id number] 40 minutes and Other Procedure CPT 15 minutes Electrical Stimulation    Total Treatment Time: 55 Minutes    Assessment/Plan   AROM continues to need increasing. Working to do so this with TE. Good tolerance of today's treatment.   Progress per Plan of Care             Ramiro Cheng, PTA  Physical Therapist

## 2017-08-25 ENCOUNTER — TREATMENT (OUTPATIENT)
Dept: PHYSICAL THERAPY | Facility: CLINIC | Age: 67
End: 2017-08-25

## 2017-08-25 DIAGNOSIS — Z98.890 S/P ROTATOR CUFF REPAIR: Primary | ICD-10-CM

## 2017-08-25 PROCEDURE — 97110 THERAPEUTIC EXERCISES: CPT | Performed by: PHYSICAL THERAPIST

## 2017-08-25 PROCEDURE — G0283 ELEC STIM OTHER THAN WOUND: HCPCS | Performed by: PHYSICAL THERAPIST

## 2017-08-25 NOTE — PROGRESS NOTES
Daily Treatment Note    Time In: 756     Time Out: 900    ICD-10-CM ICD-9-CM   1. S/P rotator cuff repair Z98.890 V45.89       Subjective   Shoulder sore after the recert visit.  Patient reports to therapy 0/10 pain, and 50% improvement.  Attendance  14/17 visits. Recert 9/1. MD grider/u9/5.      Objective        PROM:    flexion 110, abd 95         PROCEDURES AND MODALITIES:        Ice  Location: L shoulder  Rx Minutes: 15 mins  Ice S/P Rx: Yes    Electrical Stimulation  Stimulation Type: IFC  Location/Electrode Placement/Other: L shoulder  Rx Minutes: 15 mins         EXERCISE 42  Exercise 1  Exercise Name 1: PRO2 fwd/bck  Equipment/Resistance 1: 3.5  Time: 8'  Exercise 2  Exercise Name 2: Pulleys  Sets/Reps 2: 4 Exercise 3  Exercise Name 3: PROM  Time 3: 6 Exercise 4  Exercise Name 4: Supine codmans  Sets/Reps 4: 2/10x Exercise 5  Exercise Name 5: serratus punches  Sets/Reps 5: 2/10x Exercise 6  Exercise Name 6: NO$  Equipment/Resistance 6: yellow tb  Sets/Reps 6: 2/10x   Exercise 7  Exercise Name 7: Rows  Equipment/Resistance 7: green tb  Sets/Reps 7: 30x Exercise 8  Exercise Name 8: supine horz add  Sets/Reps 8: 2/10 Exercise 9  Exercise Name 9: Red Tband shoulder flex,ext, add, abd  Sets/Reps 9: 20x                                     Therapy Exercise [id number] 42 minutes and Other Procedure CPT e-stim unattended minutes 15    Timed Code Treatment: 42 Minutes and Total Treatment Time: 65 Minutes    Assessment/Plan   2 month post op.  Continue end ROM and strengthening.  Scapular stabilization.  Place and hold activities. 2x per week  Progress per Plan of Care and Progress strengthening /stabilization /functional activity      Koki Powers, PT, ATC, DPT  Physical Therapist

## 2017-08-31 ENCOUNTER — TREATMENT (OUTPATIENT)
Dept: PHYSICAL THERAPY | Facility: CLINIC | Age: 67
End: 2017-08-31

## 2017-08-31 DIAGNOSIS — Z98.890 S/P ROTATOR CUFF REPAIR: Primary | ICD-10-CM

## 2017-08-31 PROCEDURE — G0283 ELEC STIM OTHER THAN WOUND: HCPCS | Performed by: PHYSICAL THERAPIST

## 2017-08-31 PROCEDURE — 97110 THERAPEUTIC EXERCISES: CPT | Performed by: PHYSICAL THERAPIST

## 2017-09-01 ENCOUNTER — TREATMENT (OUTPATIENT)
Dept: PHYSICAL THERAPY | Facility: CLINIC | Age: 67
End: 2017-09-01

## 2017-09-01 DIAGNOSIS — Z98.890 S/P ROTATOR CUFF REPAIR: Primary | ICD-10-CM

## 2017-09-01 PROCEDURE — G0283 ELEC STIM OTHER THAN WOUND: HCPCS | Performed by: PHYSICAL THERAPIST

## 2017-09-01 PROCEDURE — 97110 THERAPEUTIC EXERCISES: CPT | Performed by: PHYSICAL THERAPIST

## 2017-09-01 PROCEDURE — 97140 MANUAL THERAPY 1/> REGIONS: CPT | Performed by: PHYSICAL THERAPIST

## 2017-09-05 ENCOUNTER — TREATMENT (OUTPATIENT)
Dept: PHYSICAL THERAPY | Facility: CLINIC | Age: 67
End: 2017-09-05

## 2017-09-05 DIAGNOSIS — Z98.890 S/P ROTATOR CUFF REPAIR: Primary | ICD-10-CM

## 2017-09-05 PROCEDURE — 97110 THERAPEUTIC EXERCISES: CPT | Performed by: PHYSICAL THERAPIST

## 2017-09-05 NOTE — PROGRESS NOTES
Daily Treatment Note    Time In: 1520      Time Out: 1612    ICD-10-CM ICD-9-CM   1. S/P rotator cuff repair Z98.890 V45.89       Subjective   No new complaints. Pt reports lifting UE better.    Patient reports to therapy 1/10 pain, and 60% improvement.  Attendance  17/20 visits. Recert next treatment. MD f/gabrielle ADAM.      Objective     V cues necessary for correct TE performance. Restriction felt [place] with PROM flex.      PROCEDURES AND MODALITIES:     Ice  Ice Applied: Yes  Location: L shoulder  Rx Minutes: 10 mins  Ice S/P Rx: Yes     EXERCISE  Exercise 1  Exercise Name 1: PRO2 fwd/bck  Time: 8'  Exercise 2  Exercise Name 2: Ecc pulleys  Sets/Reps 2: 2/1' Exercise 3  Exercise Name 3: PROM/Stretch  Time 3: 6.5' Exercise 4  Exercise Name 4: Supine flex  Equipment/Resistance 4: 1#  Sets/Reps 4: 2/10x Exercise 5  Exercise Name 5: S/L abd  Equipment/Resistance 5: 1#  Sets/Reps 5: 2/10x Exercise 6  Exercise Name 6: S/L ER  Equipment/Resistance 6: 1#  Sets/Reps 6: 20x   Exercise 7  Exercise Name 7: Wand flex  Sets/Reps 7: 20x Exercise 8  Exercise Name 8: Window wipes: flex  Sets/Reps 8: 2/10x Exercise 9  Exercise Name 9: Rope rows  Equipment/Resistance 9: 35#  Sets/Reps 9: 35x                                       Therapy Exercise [id number] 42 minutes    Total Treatment Time: 52 Minutes    Assessment/Plan   Continuing to work and increase ROM and strength with TE. Good rom of this. Pt continues to benefit from PT for further progression towards goals.   Progress per Plan of Care             Ramiro Cheng, PTA  Physical Therapist

## 2017-09-07 ENCOUNTER — TREATMENT (OUTPATIENT)
Dept: PHYSICAL THERAPY | Facility: CLINIC | Age: 67
End: 2017-09-07

## 2017-09-07 DIAGNOSIS — Z98.890 S/P ROTATOR CUFF REPAIR: Primary | ICD-10-CM

## 2017-09-07 PROCEDURE — G8985 CARRY GOAL STATUS: HCPCS | Performed by: PHYSICAL THERAPIST

## 2017-09-07 PROCEDURE — G8984 CARRY CURRENT STATUS: HCPCS | Performed by: PHYSICAL THERAPIST

## 2017-09-07 PROCEDURE — 97110 THERAPEUTIC EXERCISES: CPT | Performed by: PHYSICAL THERAPIST

## 2017-09-07 PROCEDURE — G0283 ELEC STIM OTHER THAN WOUND: HCPCS | Performed by: PHYSICAL THERAPIST

## 2017-09-07 PROCEDURE — 97140 MANUAL THERAPY 1/> REGIONS: CPT | Performed by: PHYSICAL THERAPIST

## 2017-09-07 NOTE — PROGRESS NOTES
Recertification    Diagnosis/ICD-10 Code:  S/P rotator cuff repair [Z98.890]  Referring practitioner: Toni Sandoval MD  Date of Initial Visit: 9/7/2017  Patient seen for 16/19 sessions    Time in: 0848 Time out: 0945 Total time: 42 min  Subjective:   Earl Romano states: pt reports no left shoulder pain today, 70% improvement overall      Objective:   Current condition: Fair  Test measurement: left shoulder ROM: flex 136 deg (AROM) abd 104 deg (AROM) 115 deg (AAROM) ER 68 deg IR 54 deg (AAROM) MMT: L shoulder flex/abd 3+/5 Quick Dash score 20.45%     Assessment:   Summary of Treatment:     Ice  Ice Applied: Yes  Location: L shoulder  Rx Minutes: 15 mins  Ice S/P Rx: Yes    Electrical Stimulation  Stimulation Type: IFC  Location/Electrode Placement/Other: L shoulder  Rx Minutes: 15 mins              EXERCISE  Exercise 1  Exercise Name 1: PRO2 fwd/bck  Time: 8'  Exercise 2  Exercise Name 2: Ecc pulleys  Sets/Reps 2: 2/1' Exercise 3  Exercise Name 3: PROM/Stretch  Time 3: 5' Exercise 4  Exercise Name 4: resisted rows w/ blue TB  Sets/Reps 4: 1/20 Exercise 5  Exercise Name 5: chair push ups  Sets/Reps 5: 2/10 Exercise 6  Exercise Name 6: prone Y's and T's  Sets/Reps 6: 1/10 ea   Exercise 7  Exercise Name 7: shoulder ext w/ dowel stretch  Sets/Reps 7: 1/20                                         MANUAL PT:  Manual Rx 1 Location: S/L scapular mobs  Manual Rx 1 Duration: 4'  Manual Rx 2 Location: ant glides to L GH jt  Manual Rx 2 Grade: III  Manual Rx 2 Duration: 4'  Manual Rx 3 Location: Supine L horizontal add w/ clinician OP  Manual Rx 3 Grade: III  Manual Rx 3 Duration: 3'     Progress toward previous goals: pt progressing toward ROM/MMT goals, limited by RTC weakness, focus on scapular stability        Plan:   Goals  Short-term goals (STG):  1. Independent in HEP                                                                    2.  Shoulder AROM flexion 140-PROGRESSING                                                                     3. Pain 2/10 at rest.-MET                                                                    4. ER PROM 70-PROGRESSING     Long-term goals (LTG):   1. Quick dash score 30% or less-MET                                                                    2. MMT 4/5 flexion and abd-PROGRESSING                                                                    3. Subjective improvement 75%-PROGRESSING    Timeframe: 1 month  Prognosis to achieve goals: good    Plan  Treatment plan with rationale: The patient is to  be seen 2 time(s) per week, for 4 week(s) to progress toward short and long term goals.  Joint mobilizations to decrease pain and/or increase ROM   Soft tissue mobilization as therapeutically necessary to decrease pain and/or increase mobility  Therapeutic exercises to increase functional mobility  Perform modalities as therapeutically necessary to decrease pain and increase mobility  Recommendations: Initiate/continue PT services    PT Signature: Etienne Cardona, PT      Based upon review of the patient's progress and continued therapy plan, it is my medical opinion that Earl Romano should continue physical therapy treatment at [facility]  [address] [place] [address] Dr Severino QUISPE [id number] [phone].    Signature:  Toni Sandoval MD

## 2017-09-12 ENCOUNTER — TREATMENT (OUTPATIENT)
Dept: PHYSICAL THERAPY | Facility: CLINIC | Age: 67
End: 2017-09-12

## 2017-09-12 DIAGNOSIS — Z98.890 S/P ROTATOR CUFF REPAIR: Primary | ICD-10-CM

## 2017-09-12 PROCEDURE — G0283 ELEC STIM OTHER THAN WOUND: HCPCS | Performed by: PHYSICAL THERAPIST

## 2017-09-12 PROCEDURE — 97110 THERAPEUTIC EXERCISES: CPT | Performed by: PHYSICAL THERAPIST

## 2017-09-12 NOTE — PROGRESS NOTES
Daily Treatment Note, [place]    Time In: 800      Time Out: 859    ICD-10-CM ICD-9-CM   1. S/P rotator cuff repair Z98.890 V45.89       Subjective   Cancelled MD appointment last week and is going this afternoon  Patient reports to therapy /10 pain, and 70% improvement.  Attendance 17/20  Visits.  Insurance based on Medicare guidelines.  Recert 9/28/17 . MD f/u 9/12/17.    Pain 0/10.    Objective        AROM:      PROCEDURES AND MODALITIES:       Ice  Location: L shoulder  Rx Minutes: 15 mins  Ice S/P Rx: Yes    Electrical Stimulation  Stimulation Type: IFC  Location/Electrode Placement/Other: L shoulder  Rx Minutes: 15 mins       EXERCISE 42 mins  Exercise 1  Exercise Name 1: PRO2 fwd/bck  Time: 8'  Exercise 2  Exercise Name 2: Ecc pulleys  Sets/Reps 2: 2/1' Exercise 3  Exercise Name 3: pec stretch, sleeper stretch, bicep stretch  Time 3: 5' Exercise 4  Exercise Name 4: resisted rows w/ blue TB  Sets/Reps 4: 1/20 Exercise 5  Exercise Name 5: sink push up  Sets/Reps 5: 2/10 Exercise 6  Exercise Name 6: prone Y's and T's  Sets/Reps 6: 1/10 ea   Exercise 7  Exercise Name 7: No $ bands  Equipment/Resistance 7: yellow  Sets/Reps 7: 1/20 Exercise 8  Exercise Name 8: press downs  Sets/Reps 8: 20x Exercise 9  Exercise Name 9: DB curls 5#   Sets/Reps 9: 20                                     Therapy Exercise [id number] 42 minutes    Timed Code Treatment: 42 Minutes and Total Treatment Time: 57 Minutes    Assessment/Plan   Per MD follow up this afternoon  Progress strengthening /stabilization /functional activity      Koki Powers, PT, ATC, DPT  Physical Therapist

## 2017-09-15 ENCOUNTER — TREATMENT (OUTPATIENT)
Dept: PHYSICAL THERAPY | Facility: CLINIC | Age: 67
End: 2017-09-15

## 2017-09-15 DIAGNOSIS — Z98.890 S/P ROTATOR CUFF REPAIR: Primary | ICD-10-CM

## 2017-09-15 PROCEDURE — 97110 THERAPEUTIC EXERCISES: CPT | Performed by: PHYSICAL THERAPIST

## 2017-09-15 NOTE — PROGRESS NOTES
Daily Treatment Note    Time In: 8:00      Time Out: 8:50    ICD-10-CM ICD-9-CM   1. S/P rotator cuff repair Z98.890 V45.89       Subjective   Pt reports doing well this morning. He is gradually improving.   Patient reports to therapy min pain, and  70% improvement.  Attendance  18/21 visits. Recert 9/28. MD f/gabrielle ADAM.      Objective     V cues necessary for correct TE performance. Pt reports fatigue post TE.     PROCEDURES AND MODALITIES:     Ice  Ice Applied: Yes  Location: L shoulder  Rx Minutes: 10 mins  Ice S/P Rx: Yes       EXERCISE  Exercise 1  Exercise Name 1: PRO2  Equipment/Resistance 1: 4.0  Time: 8'  Exercise 2  Exercise Name 2: Ecc pulleys  Sets/Reps 2: 2' Exercise 3  Exercise Name 3: Manual: flex, abd with scap block (light)  Time 3: 4' Exercise 4  Exercise Name 4: NO$  Equipment/Resistance 4: red tb  Sets/Reps 4: 2 sets Exercise 5  Exercise Name 5: Window push ups  Sets/Reps 5: 2/20x Exercise 6  Exercise Name 6: Horz abd  Equipment/Resistance 6: red tb  Sets/Reps 6: 20x, 10x   Exercise 7  Exercise Name 7: L Row  Equipment/Resistance 7: 25#  Sets/Reps 7: 20x, 10x Exercise 8  Exercise Name 8: Bicep curls  Equipment/Resistance 8: 6#  Sets/Reps 8: 20x Exercise 9  Exercise Name 9: Tricep pressdowns  Equipment/Resistance 9: 35#  Sets/Reps 9: 20x, 10x Exercise 10  Exercise Name 10: Tb flex  Equipment/Resistance 10: yellow tb  Sets/Reps 10: 2/10x                                     Therapy Exercise [id number] 40 minutes    Total Treatment Time: 50 Minutes    Assessment/Plan   Good tolerance of today's treatment. Pt continues to benefit from PT for further progression towards goals.  Progress per Plan of Care             Ramiro Cheng, PTA  Physical Therapist

## 2017-09-20 ENCOUNTER — TREATMENT (OUTPATIENT)
Dept: PHYSICAL THERAPY | Facility: CLINIC | Age: 67
End: 2017-09-20

## 2017-09-20 DIAGNOSIS — Z98.890 S/P ROTATOR CUFF REPAIR: Primary | ICD-10-CM

## 2017-09-20 PROCEDURE — G0283 ELEC STIM OTHER THAN WOUND: HCPCS | Performed by: PHYSICAL THERAPIST

## 2017-09-20 PROCEDURE — 97110 THERAPEUTIC EXERCISES: CPT | Performed by: PHYSICAL THERAPIST

## 2017-09-20 NOTE — PROGRESS NOTES
"Daily Treatment Note    Time In: 8:00      Time Out: 9:00    ICD-10-CM ICD-9-CM   1. S/P rotator cuff repair Z98.890 V45.89       Subjective   Pt reports doing well. No new shld complaints.   Patient reports to therapy 0/10 pain, and  70% improvement.  Attendance  19/22 visits. Recert 9/28. MD f/gabrielle AADM.      Objective     V cues necessary for correct TE performance.    PROCEDURES AND MODALITIES:       Ice  Ice Applied: Yes  Location: L shoulder  Rx Minutes: 15 mins  Ice S/P Rx: Yes    Electrical Stimulation  Stimulation Type: IFC  Location/Electrode Placement/Other: L shoulder  Rx Minutes: 15 mins       EXERCISE  Exercise 1  Exercise Name 1: PRO2  Equipment/Resistance 1: 4.0  Time: 8'  Exercise 2  Exercise Name 2: Wand flex, abd, ext  Sets/Reps 2: 10x ea Exercise 3  Exercise Name 3: Ecc pulleys  Equipment/Resistance 3: 1#  Sets/Reps 3: 2/10x Exercise 4  Exercise Name 4: Supine shld iso: flex, abd  Equipment/Resistance 4: yellow tb  Sets/Reps 4: 2 sets ea Exercise 5  Exercise Name 5: Supine CP  Equipment/Resistance 5: 6# db  Sets/Reps 5: 2/15x Exercise 6  Exercise Name 6: L Row  Equipment/Resistance 6: 20#  Sets/Reps 6: 35x   Exercise 7  Exercise Name 7: S/L ER  Equipment/Resistance 7: 2#  Sets/Reps 7: 30x Exercise 8  Exercise Name 8: ST horz ab/ad at 90°  Sets/Reps 8: 2/10x Exercise 9  Exercise Name 9: Doorway Pec S  Sets/Reps 9: 2/30\"                                       Therapy Exercise [id number] 45 minutes and Other Procedure CPT 15 minutes Electrical Stimulation    Total Treatment Time: 60 Minutes    Assessment/Plan   Good tolerance of today's treatment. Pt continues to benefit from PT for further progression towards goals.  Progress per Plan of Care             Ramiro Cheng, PTA  Physical Therapist    "

## 2017-09-22 ENCOUNTER — TREATMENT (OUTPATIENT)
Dept: PHYSICAL THERAPY | Facility: CLINIC | Age: 67
End: 2017-09-22

## 2017-09-22 DIAGNOSIS — Z98.890 S/P ROTATOR CUFF REPAIR: Primary | ICD-10-CM

## 2017-09-22 PROCEDURE — G0283 ELEC STIM OTHER THAN WOUND: HCPCS | Performed by: PHYSICAL THERAPIST

## 2017-09-22 PROCEDURE — 97110 THERAPEUTIC EXERCISES: CPT | Performed by: PHYSICAL THERAPIST

## 2017-09-22 NOTE — PROGRESS NOTES
Daily Progress Note    Time In: 804      Time Out: 900      ICD-10-CM ICD-9-CM   1. S/P rotator cuff repair Z98.890 V45.89       Subjective   Pt reports he is doing ok, and has no new reports.  Pre treatment pain    0/10  Post treatment pain  0/10    Visits 20/23   Recert Date 9/28/17   MD appointment    Pt rated % of improvement 70       Objective    NAD    AROM:                                  PROCEDURES AND MODALITIES:            Ice  Ice Applied: Yes  Location: L shoulder  Rx Minutes: 15 mins  Ice S/P Rx: Yes    Electrical Stimulation  Stimulation Type: IFC  Location/Electrode Placement/Other: L shoulder  Rx Minutes: 15 mins                   EXERCISE  Exercise 1  Exercise Name 1: PRO2  Equipment/Resistance 1: 4.0  Time: 8'  Exercise 2  Exercise Name 2: ST wand flexion, abd, ext  Sets/Reps 2: 2/10 Exercise 3  Exercise Name 3: Ecc pulleys   Equipment/Resistance 3: 1#  Sets/Reps 3: 2/10x Exercise 4  Exercise Name 4: window push up  Sets/Reps 4: 2/10 Exercise 5  Exercise Name 5: full can flex, abd  Equipment/Resistance 5: 1#  Sets/Reps 5: 10 ea Exercise 6  Exercise Name 6: CC row  Equipment/Resistance 6: 25#  Sets/Reps 6: 2/15   Exercise 7  Exercise Name 7: CC shoulder ext  Equipment/Resistance 7: 25#  Sets/Reps 7: 2/15 Exercise 8  Exercise Name 8: CC abd  Equipment/Resistance 8: 20#  Sets/Reps 8: 2/15 Exercise 9  Exercise Name 9: S/L ER  Equipment/Resistance 9: 2#  Sets/Reps 9: 2/10 Exercise 10  Exercise Name 10: shoulder tband flex, abd, IR,ER  Equipment/Resistance 10: red tb  Sets/Reps 10: 10 ea                                   MANUAL PT:                    Therapy Exercise [id number] 40 minutes    Total Treatment Time: 54 Minutes    Assessment/Plan   Good tolerance with TE this date Pt progressing well to met unmet goals, Pt was compensating with shoulder tband flexion.   Progress per Plan of Care             Liborio Mckeon, PTA  Physical Therapist

## 2017-09-25 ENCOUNTER — TREATMENT (OUTPATIENT)
Dept: PHYSICAL THERAPY | Facility: CLINIC | Age: 67
End: 2017-09-25

## 2017-09-25 DIAGNOSIS — Z98.890 S/P ROTATOR CUFF REPAIR: Primary | ICD-10-CM

## 2017-09-25 PROCEDURE — 97110 THERAPEUTIC EXERCISES: CPT | Performed by: PHYSICAL THERAPIST

## 2017-09-25 NOTE — PROGRESS NOTES
Daily Treatment Note    Time In: 8:00     Time Out: 8:56    ICD-10-CM ICD-9-CM   1. S/P rotator cuff repair Z98.890 V45.89       Subjective   No new complaints or reports.   Patient reports to therapy 0/10 pain, and  70% improvement.  Attendance  21/24 visits. Recert 9/28. MD f/gabrielle 3 weeks.      Objective     V cues necessary for correct TE performance. Restriction felt with PROM flex and abd.       PROCEDURES AND MODALITIES:   Ice  Ice Applied: Yes  Location: L shoulder  Rx Minutes: 10 mins  Ice S/P Rx: Yes  EXERCISE  Exercise 1  Exercise Name 1: PRO2  Equipment/Resistance 1: 4.0  Time: 8'  Exercise 2  Exercise Name 2: Ecc pulleys  Equipment/Resistance 2: 1#  Sets/Reps 2: 2/1' Exercise 3  Exercise Name 3: Man stretch/PROM  Time 3: 5' Exercise 4  Exercise Name 4: Supine iso at 90°  Equipment/Resistance 4: yellow tb  Sets/Reps 4: 4 sets Exercise 5  Exercise Name 5: NO$  Equipment/Resistance 5: red tb  Sets/Reps 5: 2 sets Exercise 6  Exercise Name 6: IR  Equipment/Resistance 6: green tb  Sets/Reps 6: 2 sets   Exercise 7  Exercise Name 7: TB flex  Equipment/Resistance 7: yellow  Sets/Reps 7: 2/15x Exercise 8  Exercise Name 8: TB abd   Equipment/Resistance 8: yellow  Sets/Reps 8: 2/12x Exercise 9  Exercise Name 9: Window push up  Sets/Reps 9: 20x Exercise 10  Exercise Name 10: L Fwd bow stretch  Sets/Reps 10: 2/10x                                     Therapy Exercise [id number] 46 minutes    Total Treatment Time: 56 Minutes    Assessment/Plan   Continued ROM restriction that needs more focus. Good tolerance of today's treatment.   Progress per Plan of Care             Ramiro Cheng, PTA  Physical Therapist

## 2017-09-27 ENCOUNTER — TREATMENT (OUTPATIENT)
Dept: PHYSICAL THERAPY | Facility: CLINIC | Age: 67
End: 2017-09-27

## 2017-09-27 DIAGNOSIS — Z98.890 S/P ROTATOR CUFF REPAIR: Primary | ICD-10-CM

## 2017-09-27 PROCEDURE — G8981 BODY POS CURRENT STATUS: HCPCS | Performed by: PHYSICAL THERAPIST

## 2017-09-27 PROCEDURE — G8982 BODY POS GOAL STATUS: HCPCS | Performed by: PHYSICAL THERAPIST

## 2017-09-27 PROCEDURE — 97110 THERAPEUTIC EXERCISES: CPT | Performed by: PHYSICAL THERAPIST

## 2017-09-27 NOTE — PROGRESS NOTES
Recertification    Diagnosis/ICD-10 Code:  S/P rotator cuff repair [Z98.890]  Referring practitioner: Toni Sandoval MD  Date of Initial Visit: 9/27/2017  Patient seen for 22/25 sessions    Time in: 0810 Time out: 900 Total time: 50 min  Subjective:   Earl Romano states: pt reports 70% improvement, 0/10 left shoulder pain today      Objective:   Current condition: Good  Test measurement: Quick Dash 18.18, MMT: left shoulder-flex 4+/5 abd 4/5 ER 5/5 IR 5/5 left shoulder AROM: flex 140° abd 116° ER 72° IR 52°      Assessment:   Summary of Treatment:       Ice  Ice Applied: Yes  Location: L shoulder  Rx Minutes: 10 mins  Ice S/P Rx: Yes               EXERCISE  Exercise 1  Exercise Name 1: PRO2  Equipment/Resistance 1: 4.0  Time: 10'  Exercise 2  Exercise Name 2: pulleys  Equipment/Resistance 2: flex/scap  Sets/Reps 2: 4' total Exercise 3  Exercise Name 3: L resisted scaption w/ yellow TB  Sets/Reps 3: 2/15 Exercise 4  Exercise Name 4: resisted L shoulder flex  Equipment/Resistance 4: yellow TB  Sets/Reps 4: 1/15 Exercise 5  Exercise Name 5: wall pushups  Sets/Reps 5: 1/15 Exercise 6  Exercise Name 6: prone Y's and T's  Sets/Reps 6: 1/10 ea   Exercise 7  Exercise Name 7: PROM w/ clinician  Sets/Reps 7: 7' total Exercise 8  Exercise Name 8: ROM/MMT reassessment  Time 8: 4'                                     Therapeutic ex: 40 min  Progress toward previous goals: progressing toward previous goals, address RTC and scapular strengthening        Plan:   Goals  Goals  Short-term goals (STG):  1. Independent in HEP                                           2. Shoulder AROM flexion 140-PROGRESSING                                           3. Pain 2/10 at rest.-MET                                           4. ER PROM 70-PROGRESSING            5. Left shoulder abduction AROM 130°          6. Left shoulder IR AROM 60°    Long-term goals (LTG):   1. Quick dash score 30% or less-MET                                           2.  MMT 4+/5 abduction                                           3. Subjective improvement 75%-PROGRESSING           Timeframe: 1 month  Prognosis to achieve goals: good    Plan  Treatment plan with rationale: The patient is to  be seen 2 time(s) per week, for 4 week(s) to progress toward short and long term goals.  Joint mobilizations to decrease pain and/or increase ROM   Soft tissue mobilization as therapeutically necessary to decrease pain and/or increase mobility  Therapeutic exercises to increase functional mobility  Perform modalities as therapeutically necessary to decrease pain and increase mobility  Recommendations: Initiate/continue PT services    PT Signature: Etienne Cardona, PT      Based upon review of the patient's progress and continued therapy plan, it is my medical opinion that Earl Romano should continue physical therapy treatment at [facility]  [address] [place] [address] Dr Severino QUISPE [id number] [phone].    Signature:  Toni Sandoval MD

## 2017-10-03 ENCOUNTER — TREATMENT (OUTPATIENT)
Dept: PHYSICAL THERAPY | Facility: CLINIC | Age: 67
End: 2017-10-03

## 2017-10-03 DIAGNOSIS — Z98.890 S/P ROTATOR CUFF REPAIR: Primary | ICD-10-CM

## 2017-10-03 PROCEDURE — 97110 THERAPEUTIC EXERCISES: CPT | Performed by: PHYSICAL THERAPIST

## 2017-10-03 PROCEDURE — G0283 ELEC STIM OTHER THAN WOUND: HCPCS | Performed by: PHYSICAL THERAPIST

## 2017-10-03 NOTE — PROGRESS NOTES
"Daily Treatment Note    Time In: 8:00      Time Out: 8:59    ICD-10-CM ICD-9-CM   1. S/P rotator cuff repair Z98.890 V45.89       Subjective   No new complaints/reports.   Patient reports to therapy 0/10 pain, and 70% improvement.  Attendance  23/26 visits. Recert 10/18. MD f/gabrielle 2 weeks.      Objective     V cues necessary for correct TE performance. No increased pain post treatment.       PROCEDURES AND MODALITIES:     Ice  Ice Applied: Yes  Location: L shoulder  Rx Minutes: 10 mins  Ice S/P Rx: Yes    Electrical Stimulation  Stimulation Type: IFC  Location/Electrode Placement/Other: L shoulder  Rx Minutes: 15 mins    EXERCISE  Exercise 1  Exercise Name 1: PRO2  Equipment/Resistance 1: 4.0  Time: 8'  Exercise 2  Exercise Name 2: Ecc pulleys  Equipment/Resistance 2: 1#  Sets/Reps 2: 2/1' Exercise 3  Exercise Name 3: Overhead press  Equipment/Resistance 3: 1# bar  Sets/Reps 3: 2/10x Exercise 4  Exercise Name 4: Rope rows  Equipment/Resistance 4: 35#  Sets/Reps 4: 30x Exercise 5  Exercise Name 5: TB flex, abd  Equipment/Resistance 5: yellow tb  Sets/Reps 5: 2/10x Exercise 6  Exercise Name 6: ER/IR  Equipment/Resistance 6: red tb  Sets/Reps 6: 2 sets ea   Exercise 7  Exercise Name 7: FWD bow  Sets/Reps 7: 10x, 3\" Exercise 8  Exercise Name 8: Tricep pressdowns  Equipment/Resistance 8: 30#  Sets/Reps 8: 30x Exercise 9  Exercise Name 9: Bicep curls  Equipment/Resistance 9: 5#  Sets/Reps 9: 25x                                       Therapy Exercise 43770 44 minutes and Other Procedure CPT 15 minutes Electrical Stimulation     Total Treatment Time: 59 Minutes    Assessment/Plan   Good tolerance of today's treatment. Pt continues to benefit from PT for further progression towards goals.  Progress per Plan of Care             Ramiro Cheng, PTA  Physical Therapist    "

## 2017-10-05 ENCOUNTER — TREATMENT (OUTPATIENT)
Dept: PHYSICAL THERAPY | Facility: CLINIC | Age: 67
End: 2017-10-05

## 2017-10-05 DIAGNOSIS — Z98.890 S/P ROTATOR CUFF REPAIR: Primary | ICD-10-CM

## 2017-10-05 PROCEDURE — 97110 THERAPEUTIC EXERCISES: CPT | Performed by: PHYSICAL THERAPIST

## 2017-10-05 NOTE — PROGRESS NOTES
Daily Treatment Note    Time In: 8:00      Time Out: 8:52    ICD-10-CM ICD-9-CM   1. S/P rotator cuff repair Z98.890 V45.89       Subjective   Pt reports continued no shld pain.   Patient reports to therapy 0/10 pain, and 70% improvement.  Attendance  24/27 visits. Recert 10/18. MD f/u 2 weeks.      Objective    PROM: available flex limited. V cues necessary for correct TE performance.     PROCEDURES AND MODALITIES:     Ice  Ice Applied: Yes  Location: L shoulder  Rx Minutes: 10 mins  Ice S/P Rx: Yes    EXERCISE  Exercise 1  Exercise Name 1: PRO2  Equipment/Resistance 1: 4.0  Time: 8'  Exercise 2  Exercise Name 2: PROM  Sets/Reps 2: 6' Exercise 3  Exercise Name 3: Fwd bow  Sets/Reps 3: 10x Exercise 4  Exercise Name 4: Pulleys  Sets/Reps 4: 2' Exercise 5  Exercise Name 5: Supine flex   Equipment/Resistance 5: yellow tb  Sets/Reps 5: 20x, 10x Exercise 6  Exercise Name 6: S/L abd  Equipment/Resistance 6: yellow tb  Sets/Reps 6: 2/10x   Exercise 7  Exercise Name 7: Horz abd  Equipment/Resistance 7: yellow tb  Sets/Reps 7: 12x, 10x Exercise 8  Exercise Name 8: B shld ext  Equipment/Resistance 8: red tb  Sets/Reps 8: 22x Exercise 9  Exercise Name 9: NO$  Equipment/Resistance 9: yellow tb  Sets/Reps 9: 25x                                       Therapy Exercise 82289 42 minutes    Total Treatment Time: 52 Minutes    Assessment/Plan   Good tolerance of today's treatment. Flex ROM continues to need improvement. Overall strength improving.   Progress per Plan of Care             Ramiro Cheng, PTA  Physical Therapist

## 2017-10-11 ENCOUNTER — TREATMENT (OUTPATIENT)
Dept: PHYSICAL THERAPY | Facility: CLINIC | Age: 67
End: 2017-10-11

## 2017-10-11 DIAGNOSIS — Z98.890 S/P ROTATOR CUFF REPAIR: Primary | ICD-10-CM

## 2017-10-11 PROCEDURE — 97110 THERAPEUTIC EXERCISES: CPT | Performed by: PHYSICAL THERAPIST

## 2017-10-13 ENCOUNTER — TREATMENT (OUTPATIENT)
Dept: PHYSICAL THERAPY | Facility: CLINIC | Age: 67
End: 2017-10-13

## 2017-10-13 DIAGNOSIS — Z98.890 S/P ROTATOR CUFF REPAIR: Primary | ICD-10-CM

## 2017-10-13 PROCEDURE — 97110 THERAPEUTIC EXERCISES: CPT | Performed by: PHYSICAL THERAPIST

## 2017-10-13 PROCEDURE — 97140 MANUAL THERAPY 1/> REGIONS: CPT | Performed by: PHYSICAL THERAPIST

## 2017-10-13 NOTE — PROGRESS NOTES
Daily Treatment Note    Time In: 848     Time Out: 921    ICD-10-CM ICD-9-CM   1. S/P rotator cuff repair Z98.890 V45.89       Subjective   Pt reports no pain now with use of the left shoulder.  Patient reports to therapy 0/10 pain, and  70% improvement.  Attendance  26/29 visits. Recert 10/18/17. MD f/u JAIR.      Objective        AROM: L shoulder-flex 144° abd 126°               PROCEDURES AND MODALITIES:                  EXERCISE  Exercise 1  Exercise Name 1: PRO2  Equipment/Resistance 1: 4.0  Time: 8'  Exercise 2  Exercise Name 2: chair push ups  Sets/Reps 2: 2/10 Exercise 3  Exercise Name 3: wall push ups  Sets/Reps 3: 1/15 Exercise 4  Exercise Name 4: wand shoulder ext stretch  Sets/Reps 4: 1/20 Exercise 5  Exercise Name 5: standing L shoulder abd  Equipment/Resistance 5: 2#  Sets/Reps 5: 2/10 Exercise 6  Exercise Name 6: standing scaption to 90°  Sets/Reps 6: 2/10   Exercise 7  Exercise Name 7: S/L L shoulder abd  Equipment/Resistance 7: 2#  Sets/Reps 7: 1/10                                         MANUAL PT:  Manual Rx 1 Location: left shoulder flex/abd/IR w/ clinician OP  Manual Rx 1 Duration: 8'    Manual PT [id number] 8 minutes and Therapy Exercise [id number] 25 minutes    Total Treatment Time: 33 Minutes    Assessment/Plan   Pt with improved L shoulder flex/abd AROM, limited L shoulder IR ROM and L shoulder abduction MMT  Progress strengthening /stabilization /functional activity             Etienne Cardona, PT  Physical Therapist

## 2017-10-19 ENCOUNTER — TREATMENT (OUTPATIENT)
Dept: PHYSICAL THERAPY | Facility: CLINIC | Age: 67
End: 2017-10-19

## 2017-10-19 DIAGNOSIS — Z98.890 S/P ROTATOR CUFF REPAIR: Primary | ICD-10-CM

## 2017-10-19 PROCEDURE — G8984 CARRY CURRENT STATUS: HCPCS | Performed by: PHYSICAL THERAPIST

## 2017-10-19 PROCEDURE — 97140 MANUAL THERAPY 1/> REGIONS: CPT | Performed by: PHYSICAL THERAPIST

## 2017-10-19 PROCEDURE — G8985 CARRY GOAL STATUS: HCPCS | Performed by: PHYSICAL THERAPIST

## 2017-10-19 PROCEDURE — G8986 CARRY D/C STATUS: HCPCS | Performed by: PHYSICAL THERAPIST

## 2017-10-19 PROCEDURE — 97110 THERAPEUTIC EXERCISES: CPT | Performed by: PHYSICAL THERAPIST

## 2017-10-19 NOTE — PROGRESS NOTES
PT Discharge Summary    Diagnosis/ICD-10 Code:  S/P rotator cuff repair [Z98.890]  Referring practitioner: Toni Sandoval MD  Date of Initial Visit: 10/19/2017  Patient seen for 27/30 sessions    Time in: 845 Time out: 920 Total time: 35 min  Subjective:   Earl Romano states: pt reports 80+% improvement overall since starting PT, 0/10 left shoulder pain at this time      Objective:   Current condition: Good  Test measurement: Quick Dash score 15.91% MMT: left shoulder-flex 4+/5 abd 4+/5 AROM: left shoulder-flex 140° abduction 135° ER 75° IR 58°      Assessment:   Summary of Treatment:     EXERCISE-25 min  Exercise 1  Exercise Name 1: PRO2  Equipment/Resistance 1: 4.0  Time: 8'  Exercise 2  Exercise Name 2: chair push ups  Sets/Reps 2: 1/15 Exercise 3  Exercise Name 3: wall push ups  Sets/Reps 3: 1/15 Exercise 4  Exercise Name 4: wand shoulder ext stretch  Sets/Reps 4: 1/20 Exercise 5  Exercise Name 5: prone Y's and T's  Sets/Reps 5: 1/15     Exercise 7  Exercise Name 7: doorway pec stretch  Sets/Reps 7: 1/15                                         MANUAL PT:  Manual Rx 1 Location: left shoulder flex/abd/IR w/ clinician OP  Manual Rx 1 Duration: 6'  Manual Rx 2 Location: s/p scapular mobs (L)  Manual Rx 2 Type: pro/retraction, elevation/depression,  Manual Rx 2 Grade: upward/downward rotation  Manual Rx 2 Duration: 4'    Progress toward previous goals: met all goals with exception of STG#6, met a functional plateau        Plan:   Goals  Short-term goals (STG):  1. Independent in HEP-MET                                           2. Shoulder AROM flexion 140-MET                                           3. Pain 2/10 at rest.-MET                                           4. ER PROM 70°-MET                                           5. Left shoulder abduction AROM 130°-MET                                           6. Left shoulder IR AROM 60°PROGRESSING     Long-term goals (LTG):   1. Quick dash score 30% or  less-MET                                           2. MMT 4+/5 abduction-MET                                           3. Subjective improvement 75%-MET    Timeframe: N/A  Prognosis to achieve goals: N/A-discharged    Plan  Treatment plan with rationale: N/A-discharged due to met a functional plateau  Recommendations: told to continue HEP as instructed.    PT Signature: Etienne Cardona, MAK      Based upon review of the patient's attainment of meeting a functional plateau, it is my medical opinion that Earl Romano should discharge physical therapy treatment at [facility]  [address] [place] [address] [address] [place] [address]  [phone].    Signature:  Toni Sandoval MD

## 2024-11-18 NOTE — PROGRESS NOTES
Called pt to cancel 12/05 appt. After cardiac cath, waiting for HFU to be scheduled before rescheduling appt.    Daily Treatment Note    Time In: 9:30      Time Out: 10:10    ICD-10-CM ICD-9-CM   1. S/P rotator cuff repair Z98.890 V45.89       Subjective   Pt reports shld doing well.   Patient reports to therapy 0/10 pain, and 70% improvement.  Attendance  25/28 visits. Recert 10/18. MD f/u 2 weeks.      Objective     V cues necessary for correct TE performance. No increased pain post treatment.     AROM:    Flex/Abd 115°.        EXERCISE  Exercise 1  Exercise Name 1: PRO2  Equipment/Resistance 1: 4.0  Time: 8'  Exercise 2  Exercise Name 2: PROM: flex  Time 2: 3' Exercise 3  Exercise Name 3: Fwd bow  Sets/Reps 3: 12x Exercise 4  Exercise Name 4: Incline shld flex  Equipment/Resistance 4: 1#  Sets/Reps 4: 2/10x Exercise 5  Exercise Name 5: Incline CP  Equipment/Resistance 5: 4#  Sets/Reps 5: 20x Exercise 6  Exercise Name 6: Incline iso: scaption, abd @ 90°  Equipment/Resistance 6: yellow tb  Sets/Reps 6: 2/10x ea   Exercise 7  Exercise Name 7: NO$  Equipment/Resistance 7: red tb  Sets/Reps 7: 2 sets Exercise 8  Exercise Name 8: Rows  Equipment/Resistance 8: black tb  Sets/Reps 8: 30x Exercise 9  Exercise Name 9: ST abd  Equipment/Resistance 9: yellow tb  Sets/Reps 9: 20x Exercise 10  Exercise Name 10: IR   Equipment/Resistance 10: green tb  Sets/Reps 10: 30x                                   Therapy Exercise 15072 40 minutes    Total Treatment Time: 40 Minutes    Assessment/Plan   Good tolerance of today's treatment. AROM continues to need improvement. Pt continues to do well with pain.   Progress per Plan of Care             Ramiro Cheng, PTA  Physical Therapist